# Patient Record
Sex: FEMALE | Race: WHITE | NOT HISPANIC OR LATINO | Employment: FULL TIME | URBAN - METROPOLITAN AREA
[De-identification: names, ages, dates, MRNs, and addresses within clinical notes are randomized per-mention and may not be internally consistent; named-entity substitution may affect disease eponyms.]

---

## 2020-03-04 ENCOUNTER — OFFICE VISIT (OUTPATIENT)
Dept: ENDOCRINOLOGY | Facility: CLINIC | Age: 40
End: 2020-03-04

## 2020-03-04 VITALS
DIASTOLIC BLOOD PRESSURE: 88 MMHG | OXYGEN SATURATION: 99 % | BODY MASS INDEX: 41.95 KG/M2 | WEIGHT: 293 LBS | SYSTOLIC BLOOD PRESSURE: 148 MMHG | HEIGHT: 70 IN | HEART RATE: 89 BPM

## 2020-03-04 DIAGNOSIS — E04.2 NONTOXIC MULTINODULAR GOITER: Primary | ICD-10-CM

## 2020-03-04 PROCEDURE — 99213 OFFICE O/P EST LOW 20 MIN: CPT | Performed by: INTERNAL MEDICINE

## 2020-03-04 NOTE — PROGRESS NOTES
Chief Complaint   Patient presents with   • Thyroid Problem     Previous pt here for follow up        HPI   Katelyn cMdaniels is a 40 y.o. female had concerns including Thyroid Problem (Previous pt here for follow up).      She had several thyroid nodules biopsied at Dr. Calderon's office in [place].     Most recent US from 2/27/20 completed in [place] showed:  1. Right lobe nodule measuring 5.2 cm, previously measured 4.7 x 2.4 cm.    2. Right lobe nodule previously measuring 1.9 x 2.0 x 1.0 cm now measuring 2.3 cm  3. Right lobe 1.0 cm focal calcification, previously measured 0.8 cm    In the left lobe:  1.  0.9 x 0.7 x 0.4 cm hypoechoic nodule.  2.  1.6 cm mid lobe nodule, previously measured 1.3 x 0.7 cm  3.  1.2 cm in the superior mid portion, previously measured 1.0 x 0.8 x 0.6 cm  4.  1 cm hypoechoic irregular nodule in the superior left portion with some shadowing posteriorly, not clearly demonstrated on the prior study    She was referred to general surgery for thyroidectomy but requested to see endocrinology in follow-up prior to proceeding with this.  She reports due to her history of lupus she is complications with medications and has had perioperative complications in the past.  She is willing to proceed with thyroidectomy if indicated and understands that repeat FNAs may be needed.    She denies any issues breathing.   Occasionally has some issues swallowing.     Denies any personal history of radiation therapy to the neck or chest.  No known family history of thyroid cancer.      The following portions of the patient's history were reviewed and updated as appropriate: allergies, current medications, past family history, past medical history, past social history, past surgical history and problem list.    Review of Systems   Constitutional: Positive for fatigue, fever and unexpected weight gain.   HENT: Positive for congestion and sinus pressure.    Eyes: Negative.    Respiratory: Negative.   "  Cardiovascular: Negative.    Gastrointestinal: Negative.    Endocrine: Negative.    Genitourinary: Positive for frequency and urgency.   Musculoskeletal: Positive for back pain and myalgias.   Skin: Positive for dry skin and rash. Negative for bruise.   Allergic/Immunologic: Negative.    Neurological: Positive for dizziness. Negative for numbness and headache.   Hematological: Negative.    Psychiatric/Behavioral: Negative.       ROS was reviewed and verified. All other ROS negative.    /88 (BP Location: Left arm, Patient Position: Sitting, Cuff Size: Adult)   Pulse 89   Ht 176.5 cm (69.5\")   Wt (!) 136 kg (300 lb 9.6 oz)   LMP 02/26/2020 (Approximate)   SpO2 99%   Breastfeeding No   BMI 43.75 kg/m²      Physical Exam     Constitutional:  well developed; well nourished  no acute distress  obese - Body mass index is 43.75 kg/m².   ENT/Thyroid: enlarged diffusely, 3 times normal size, no distinctly palpable nodules, no palpable lymphadenopathy   Eyes: EOM intact  Conjunctiva: clear   Respiratory:  breathing is unlabored  clear to auscultation bilaterally   Cardiovascular:  regular rate and rhythm, S1, S2 normal, no murmur, click, rub or gallop   Chest:  Not performed.   Abdomen: Not performed.   : Not performed.   Musculoskeletal: negative findings:  ROM of all joints is normal, no deformities present   Skin: dry and warm   Neuro: normal without focal findings, mental status, speech normal, alert and oriented x3 and FENG   Psych: oriented to time, place and person, mood and affect are within normal limits     LABS   Labs from 1/24/2020 showed TSH 0.55, free T4 1.3, TPO normal    Assessment and Plan    Teather was seen today for thyroid problem.    Diagnoses and all orders for this visit:    Nontoxic multinodular goiter  Most recent US from 2/27/20 completed in [place] showed:  1. Right lobe nodule measuring 5.2 cm, previously measured 4.7 x 2.4 cm.    2. Right lobe nodule previously measuring 1.9 x " 2.0 x 1.0 cm now measuring 2.3 cm  3. Right lobe 1.0 cm focal calcification, previously measured 0.8 cm  In the left lobe:  1.  0.9 x 0.7 x 0.4 cm hypoechoic nodule.  2.  1.6 cm mid lobe nodule, previously measured 1.3 x 0.7 cm  3.  1.2 cm in the superior mid portion, previously measured 1.0 x 0.8 x 0.6 cm  4.  1 cm hypoechoic irregular nodule in the superior left portion with some shadowing posteriorly, not clearly demonstrated on the prior study    Patient has had a multinodular goiter known for years with some increase in size of several nodules.  History of benign FNAs of several nodules with Endocrinology in [place].  These records have been requested.  Benign nodules often grow and may have calcifications.   I requested records from my prior office and also from her prior FNAs.  Depending on size of the nodules when previously biopsied, several may need repeat FNA.  This will be scheduled within the next several weeks and results obtained, prior to the referral to endocrine surgery.  TFTs are normal.        Return pending records review, will schedule biopsies. The patient was instructed to contact the clinic with any interval questions or concerns.    María Esteves, DO   Endocrinologist    Please note that portions of this document were completed with a voice recognition program. Efforts were made to edit the dictations, but occasionally words are mis-transcribed.

## 2020-03-05 ENCOUNTER — TELEPHONE (OUTPATIENT)
Dept: ENDOCRINOLOGY | Facility: CLINIC | Age: 40
End: 2020-03-05

## 2020-03-10 ENCOUNTER — OFFICE VISIT (OUTPATIENT)
Dept: ENDOCRINOLOGY | Facility: CLINIC | Age: 40
End: 2020-03-10

## 2020-03-10 VITALS
HEART RATE: 81 BPM | DIASTOLIC BLOOD PRESSURE: 84 MMHG | SYSTOLIC BLOOD PRESSURE: 142 MMHG | OXYGEN SATURATION: 98 % | BODY MASS INDEX: 41.95 KG/M2 | HEIGHT: 70 IN | WEIGHT: 293 LBS

## 2020-03-10 DIAGNOSIS — E04.2 NONTOXIC MULTINODULAR GOITER: Primary | ICD-10-CM

## 2020-03-10 PROCEDURE — 10006 FNA BX W/US GDN EA ADDL: CPT | Performed by: INTERNAL MEDICINE

## 2020-03-10 PROCEDURE — 10005 FNA BX W/US GDN 1ST LES: CPT | Performed by: INTERNAL MEDICINE

## 2020-03-10 RX ORDER — HYDROXYZINE HYDROCHLORIDE 25 MG/1
TABLET, FILM COATED ORAL
COMMUNITY
Start: 2020-03-05

## 2020-03-10 NOTE — PROGRESS NOTES
Informal ultrasound was completed prior to the FNA procedure. The pt has a diffusely nodular goiter with many areas of pseudonodules and a few more distinct nodules. My general feeling is that many of the nodules measured from the US 2/2020 at an outside facility are in fact pseudonodules.     Despite that, I identified a right 1.9 x 1.4 x 1.9 cm solid nodule with a dense eggshell-like calcification, left 2.4 x 2.2 x 2.4 cm isoechoic solid nodule and a 2.1 x 1.8 x 2.4 cm solid iso-to hyperechoic nodule in the isthmus which were biopsied today.  Although these were measured as individual nodules for FNA purposes, these appear to be mostly consistent with a diffusely nodular goiter.          Thyroid Biopsy Procedure Note      Indications: Thyroid Nodule    Anesthesia: Ethyl chloride spray    Procedure Details   The procedure, risks and complications were discussed in detail with the patient (including but not limited to infection, bleeding), and the patient signed consent for the procedure.    The neck was prepped in sterile fashion.  Biopsy site: right 1.9 cm solid nodule with calcification.  With ultrasound guidance of needle localization,   3  aspirates were obtained with sterile 27 g. needles.  8 slides were prepared with both air drying and immediate cytology fixative.    A single container with 20 ml of cytology fixative was also used to collect needle and syringe washings.   Specimens were sent to pathology.     Biopsy site: left 2.4 cm isoechoic solid nodule.  With ultrasound guidance of needle localization,   3  aspirates were obtained with sterile 27 g. needles.  6 slides were prepared with both air drying and immediate cytology fixative.    A single container with 20 ml of cytology fixative was also used to collect needle and syringe washings.   Specimens were sent to pathology.     Biopsy site: isthmus 2.1 cm solid iso to hyperechoic solid nodule.  With ultrasound guidance of needle localization,   3   aspirates were obtained with sterile 27 g. needles.  6 slides were prepared with both air drying and immediate cytology fixative.    A single container with 20 ml of cytology fixative was also used to collect needle and syringe washings.   Specimens were sent to pathology.     The patient tolerated the procedure without difficulty or complications.